# Patient Record
Sex: MALE | Race: OTHER | URBAN - METROPOLITAN AREA
[De-identification: names, ages, dates, MRNs, and addresses within clinical notes are randomized per-mention and may not be internally consistent; named-entity substitution may affect disease eponyms.]

---

## 2020-03-17 ENCOUNTER — EMERGENCY (EMERGENCY)
Facility: HOSPITAL | Age: 20
LOS: 0 days | Discharge: HOME | End: 2020-03-17
Attending: EMERGENCY MEDICINE | Admitting: EMERGENCY MEDICINE
Payer: MEDICAID

## 2020-03-17 VITALS
TEMPERATURE: 101 F | RESPIRATION RATE: 19 BRPM | HEART RATE: 115 BPM | SYSTOLIC BLOOD PRESSURE: 128 MMHG | WEIGHT: 152.56 LBS | OXYGEN SATURATION: 96 % | DIASTOLIC BLOOD PRESSURE: 69 MMHG

## 2020-03-17 VITALS
SYSTOLIC BLOOD PRESSURE: 97 MMHG | RESPIRATION RATE: 18 BRPM | DIASTOLIC BLOOD PRESSURE: 61 MMHG | HEART RATE: 97 BPM | TEMPERATURE: 100 F | OXYGEN SATURATION: 98 %

## 2020-03-17 DIAGNOSIS — J02.9 ACUTE PHARYNGITIS, UNSPECIFIED: ICD-10-CM

## 2020-03-17 DIAGNOSIS — R05 COUGH: ICD-10-CM

## 2020-03-17 PROCEDURE — 99284 EMERGENCY DEPT VISIT MOD MDM: CPT

## 2020-03-17 RX ORDER — DEXAMETHASONE 0.5 MG/5ML
10 ELIXIR ORAL ONCE
Refills: 0 | Status: COMPLETED | OUTPATIENT
Start: 2020-03-17 | End: 2020-03-17

## 2020-03-17 RX ORDER — IBUPROFEN 200 MG
600 TABLET ORAL ONCE
Refills: 0 | Status: COMPLETED | OUTPATIENT
Start: 2020-03-17 | End: 2020-03-17

## 2020-03-17 RX ADMIN — Medication 600 MILLIGRAM(S): at 17:53

## 2020-03-17 RX ADMIN — Medication 10 MILLIGRAM(S): at 17:52

## 2020-03-17 NOTE — ED PROVIDER NOTE - CLINICAL SUMMARY MEDICAL DECISION MAKING FREE TEXT BOX
18 yo M with no PMH, here with cough and sore throat x 3 days and fever today, took tylenol at 3 PM. No recent travel or sick contacts. Exam - Gen - NAD, Head - NCAT, TMs - clear b/l, Pharynx - (+) erythema, no exudates, MMM, Neck - mild cervical LAD, Heart - RRR, no m/g/r, Lungs - CTAB, no w/c/r, Abdomen - soft, NT, ND, Skin - No rash, Extremities - FROM, no edema, erythema, ecchymosis, Neuro - CN 2-12 intact, nl strength and sensation, nl gait. Plan - motrin, decadron, throat culture. Dx - pharyngitis.

## 2020-03-17 NOTE — ED PROVIDER NOTE - PATIENT PORTAL LINK FT
You can access the FollowMyHealth Patient Portal offered by [facility] by registering at the following website: [email or web address]. By joining [facility]’s FollowMyHealth portal, you will also be able to view your health information using other applications (apps) compatible with our system.

## 2020-03-17 NOTE — ED PROVIDER NOTE - ATTENDING CONTRIBUTION TO CARE
18 yo M with no PMH, here with cough and sore throat x 3 days and fever today, took tylenol at 3 PM. No recent travel or sick contacts. Exam - Gen - NAD, Head - NCAT, TMs - clear b/l, Pharynx - (+) erythema, no exudates, MMM, Neck - mild cervical LAD, Heart - RRR, no m/g/r, Lungs - CTAB, no w/c/r, Abdomen - soft, NT, ND, Skin - No rash, Extremities - FROM, no edema, erythema, ecchymosis, Neuro - CN 2-12 intact, nl strength and sensation, nl gait. Plan - motrin, decadron, throat culture. 20 yo M with no PMH, here with cough and sore throat x 3 days and fever today, took tylenol at 3 PM. No recent travel or sick contacts. Exam - Gen - NAD, Head - NCAT, TMs - clear b/l, Pharynx - (+) erythema, no exudates, MMM, Neck - mild cervical LAD, Heart - RRR, no m/g/r, Lungs - CTAB, no w/c/r, Abdomen - soft, NT, ND, Skin - No rash, Extremities - FROM, no edema, erythema, ecchymosis, Neuro - CN 2-12 intact, nl strength and sensation, nl gait. Plan - motrin, decadron, throat culture. Dx - pharyngitis.

## 2020-03-17 NOTE — ED PROVIDER NOTE - OBJECTIVE STATEMENT
20 y/o M no PMHx, UTD on vaccines presents to ED with non-productive cough and sore throat x2 days and fever at home starting today. Pt took Tylenol at 3pm today. Denies SOB, vomiting, diarrhea, sick contacts, recent travel.

## 2020-03-17 NOTE — ED PROVIDER NOTE - NS ED ROS FT
Review of Systems:  CONSTITUTIONAL: +fever, No diaphoresis, No weight change  SKIN: No rash  HEMATOLOGIC: No abnormal bleeding or bruising  EYES: No eye pain, No blurred vision  ENT: No change in hearing, +sore throat, No neck pain, No rhinorrhea, No ear pain  RESPIRATORY: No shortness of breath, +cough  CARDIAC: No chest pain, No palpitations  GI: No abdominal pain, No nausea, No vomiting, No diarrhea, No constipation, No bright red blood per rectum or melena. No flank pain  : No dysuria, frequency, hematuria.   MUSCULOSKELETAL: No joint paint, No swelling, No back pain  NEUROLOGIC: No numbness, No focal weakness, No headache, No dizziness  All other systems negative, unless specified in HPI

## 2020-03-17 NOTE — ED PROVIDER NOTE - PHYSICAL EXAMINATION
CONSTITUTIONAL: Well-developed; well-nourished; in no acute distress.   SKIN: warm, dry  HEAD: Normocephalic; atraumatic.  EYES: no conjunctival injection. PERRLA. EOMI.  ENT: No nasal discharge; airway clear. MMM.   NECK: Supple; non tender.  CARD: S1, S2 normal; no murmurs, gallops, or rubs. Regular rate and rhythm.   RESP: No wheezes, rales or rhonchi.  ABD: soft ntnd.   EXT: Normal ROM.  No LE edema.   LYMPH: +tender b/l acute cervical adenopathy.  NEURO: Alert, oriented, grossly unremarkable.  PSYCH: Cooperative, appropriate. CONSTITUTIONAL: Well-developed; well-nourished; in no acute distress.   SKIN: warm, dry  HEAD: Normocephalic; atraumatic.  EYES: no conjunctival injection. PERRLA. EOMI.  ENT: No nasal discharge; airway clear. MMM. +pharyngeal edema and erythema, no exudates or vesicles. TMs pearly gray b/l.   NECK: Supple; non tender.  CARD: S1, S2 normal; no murmurs, gallops, or rubs. Regular rate and rhythm.   RESP: No wheezes, rales or rhonchi.  ABD: soft ntnd.   EXT: Normal ROM.  No LE edema.   LYMPH: +b/l acute cervical adenopathy.  NEURO: Alert, oriented, grossly unremarkable.  PSYCH: Cooperative, appropriate.

## 2020-03-17 NOTE — ED PEDIATRIC TRIAGE NOTE - CHIEF COMPLAINT QUOTE
Patient is a/o x4, has c/o cough, sore throat and low grade fever. Temp at home was 101.1 Tylenol was taken at home at 15:00. Patient denies any sick contacts.

## 2020-03-19 LAB
CULTURE RESULTS: SIGNIFICANT CHANGE UP
SPECIMEN SOURCE: SIGNIFICANT CHANGE UP